# Patient Record
Sex: FEMALE | Race: WHITE
[De-identification: names, ages, dates, MRNs, and addresses within clinical notes are randomized per-mention and may not be internally consistent; named-entity substitution may affect disease eponyms.]

---

## 2017-02-08 ENCOUNTER — HOSPITAL ENCOUNTER (EMERGENCY)
Dept: HOSPITAL 76 - ED | Age: 37
LOS: 1 days | Discharge: HOME | End: 2017-02-09
Payer: COMMERCIAL

## 2017-02-08 DIAGNOSIS — S29.011A: Primary | ICD-10-CM
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
## 2017-02-08 DIAGNOSIS — M25.511: ICD-10-CM
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
## 2017-02-08 DIAGNOSIS — Z88.6: ICD-10-CM

## 2017-02-08 PROCEDURE — 71020: CPT

## 2017-02-08 PROCEDURE — 99283 EMERGENCY DEPT VISIT LOW MDM: CPT

## 2017-02-09 VITALS — DIASTOLIC BLOOD PRESSURE: 42 MMHG | SYSTOLIC BLOOD PRESSURE: 89 MMHG

## 2017-02-09 NOTE — XRAY PRELIMINARY REPORT
Accession: [id number]

Exam: XR Chest 2 View PA/LAT

 

IMPRESSION: 

1. No acute abnormality seen in the chest.

 

[facility]

 

SITE ID: [id number]

## 2017-02-09 NOTE — XRAY REPORT
EXAM:

CHEST RADIOGRAPHY

 

EXAM DATE: 2/9/2017 12:43 AM.

 

CLINICAL HISTORY: Chest and sternal pain on the right, 4th rib.

 

COMPARISON: None.

 

TECHNIQUE: 2 views.

 

FINDINGS: 

Lungs/Pleura: No focal opacities evident. No pneumothorax or pleural effusion. Normal volumes.

 

Mediastinum: Heart and mediastinal contours are unremarkable.

 

Other: None.

 

IMPRESSION: 

1. No acute abnormality seen in the chest.

 

[facility]

Referring Provider Line: [phone]

 

SITE ID: [id number]
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
## 2017-03-11 ENCOUNTER — HOSPITAL ENCOUNTER (EMERGENCY)
Age: 37
Discharge: HOME | End: 2017-03-11
Payer: COMMERCIAL

## 2017-03-11 DIAGNOSIS — N12: Primary | ICD-10-CM

## 2017-03-11 DIAGNOSIS — M51.35: ICD-10-CM

## 2017-03-11 DIAGNOSIS — Z97.5: ICD-10-CM

## 2017-03-11 PROCEDURE — 83690 ASSAY OF LIPASE: CPT

## 2017-03-11 PROCEDURE — 99283 EMERGENCY DEPT VISIT LOW MDM: CPT

## 2017-03-11 PROCEDURE — 99284 EMERGENCY DEPT VISIT MOD MDM: CPT

## 2017-03-11 PROCEDURE — 72100 X-RAY EXAM L-S SPINE 2/3 VWS: CPT

## 2017-03-11 PROCEDURE — 36415 COLL VENOUS BLD VENIPUNCTURE: CPT

## 2017-03-11 PROCEDURE — 85025 COMPLETE CBC W/AUTO DIFF WBC: CPT

## 2017-03-11 PROCEDURE — 81001 URINALYSIS AUTO W/SCOPE: CPT

## 2017-03-11 PROCEDURE — 80053 COMPREHEN METABOLIC PANEL: CPT

## 2017-03-11 PROCEDURE — 96372 THER/PROPH/DIAG INJ SC/IM: CPT

## 2017-03-11 PROCEDURE — 81025 URINE PREGNANCY TEST: CPT

## 2017-05-06 ENCOUNTER — HOSPITAL ENCOUNTER (EMERGENCY)
Dept: HOSPITAL 76 - ED | Age: 37
Discharge: HOME | End: 2017-05-06
Payer: COMMERCIAL

## 2017-05-06 VITALS — SYSTOLIC BLOOD PRESSURE: 119 MMHG | DIASTOLIC BLOOD PRESSURE: 75 MMHG

## 2017-05-06 DIAGNOSIS — G62.9: Primary | ICD-10-CM

## 2017-05-06 DIAGNOSIS — R07.89: ICD-10-CM

## 2017-05-06 LAB
ALBUMIN/GLOB SERPL: 1.4 {RATIO} (ref 1–2.2)
ANION GAP SERPL CALCULATED.4IONS-SCNC: 8 MMOL/L (ref 6–13)
BILIRUB BLD-MCNC: < 0.2 MG/DL (ref 0.2–1)
BUN SERPL-MCNC: 15 MG/DL (ref 6–20)
CALCIUM UR-MCNC: 9.1 MG/DL (ref 8.5–10.3)
CHLORIDE SERPL-SCNC: 100 MMOL/L (ref 101–111)
CO2 SERPL-SCNC: 27 MMOL/L (ref 21–32)
CREAT SERPLBLD-SCNC: 0.7 MG/DL (ref 0.4–1)
CUL URINE ADD CHARGE: (no result)
GFRSERPLBLD MDRD-ARVRAT: 95 ML/MIN/{1.73_M2} (ref 89–?)
GLOBULIN SER-MCNC: 3.4 G/DL (ref 2.1–4.2)
GLUCOSE SERPL-MCNC: 96 MG/DL (ref 70–100)
HCG UR QL: NEGATIVE
LIPASE SERPL-CCNC: 40 U/L (ref 22–51)
PH UR STRIP.AUTO: 5 PH (ref 5–7.5)
POTASSIUM SERPL-SCNC: 3.8 MMOL/L (ref 3.5–5)
PROT SPEC-MCNC: 8.1 G/DL (ref 6.7–8.2)
SODIUM SERPLBLD-SCNC: 135 MMOL/L (ref 135–145)
SP GR UR STRIP.AUTO: <=1.005 (ref 1–1.03)
UA CHARGE (STRIP ONLY): YES
UA W/ MICROSCOPIC CHARGE: (no result)
UR CULTURE IF IND: (no result)
UROBILINOGEN UR STRIP.AUTO-MCNC: NEGATIVE MG/DL

## 2017-05-06 PROCEDURE — 99283 EMERGENCY DEPT VISIT LOW MDM: CPT

## 2017-05-06 PROCEDURE — 36415 COLL VENOUS BLD VENIPUNCTURE: CPT

## 2017-05-06 PROCEDURE — 84443 ASSAY THYROID STIM HORMONE: CPT

## 2017-05-06 PROCEDURE — 93010 ELECTROCARDIOGRAM REPORT: CPT

## 2017-05-06 PROCEDURE — 93005 ELECTROCARDIOGRAM TRACING: CPT

## 2017-05-06 PROCEDURE — 80053 COMPREHEN METABOLIC PANEL: CPT

## 2017-05-06 PROCEDURE — 81025 URINE PREGNANCY TEST: CPT

## 2017-05-06 PROCEDURE — 99284 EMERGENCY DEPT VISIT MOD MDM: CPT

## 2017-05-06 PROCEDURE — 83690 ASSAY OF LIPASE: CPT

## 2017-05-06 PROCEDURE — 87086 URINE CULTURE/COLONY COUNT: CPT

## 2017-05-06 PROCEDURE — 81001 URINALYSIS AUTO W/SCOPE: CPT

## 2017-05-06 PROCEDURE — 81003 URINALYSIS AUTO W/O SCOPE: CPT

## 2017-05-06 NOTE — ED PHYSICIAN DOCUMENTATION
History of Present Illness





- Stated complaint


Stated Complaint: FEET PX





- Chief complaint


Chief Complaint: Ext Problem





- History obtained from


History obtained from: Patient





- History of Present Illness


Timing: Other (She is subacute occasional stabbing pains to the bottom of her 

feet, feels like she is getting stabbed, it only lasts for a few moments.  

Today it is associated with chest pressure and her hands are going numb as 

well.  There is no shortness of breath.  She is on a ketogenic diet.)





Review of Systems


Constitutional: denies: Fever, Chills


Nose: reports: Reviewed and negative


Cardiac: reports: Chest pain / pressure.  denies: Palpitations, Pedal edema, 

Calf pain


Respiratory: denies: Dyspnea





PD PAST MEDICAL HISTORY





- Past Medical History


: Kidney stones





- Past Surgical History


Past Surgical History: Yes





- Present Medications


Home Medications: 


 Ambulatory Orders











 Medication  Instructions  Recorded  Confirmed


 


Ciprofloxacin HCl [Cipro] 500 mg PO BID #20 tablet 03/11/17 


 


Promethazine [Phenergan] 25 mg PO Q6H PRN #10 tab 03/11/17 














- Allergies


Allergies/Adverse Reactions: 


 Allergies











Allergy/AdvReac Type Severity Reaction Status Date / Time


 


ibuprofen [From Motrin] Allergy  Respiratory Verified 02/09/17 00:06


 


naproxen sodium * Allergy  Respiratory Verified 02/09/17 00:06





[From Aleve Cold and Sinus]     














- Social History


Does the pt smoke?: No


Smoking Status: Never smoker


Does the pt drink ETOH?: No


Does the pt have substance abuse?: No





- Immunizations


Immunizations are current?: Yes





- POLST


Patient has POLST: No





PD ED PE NORMAL





- Vitals


Vital signs reviewed: Yes





- General


General: Alert and oriented X 3, No acute distress





- Neck


Neck: Supple, no meningeal sign, No bony TTP





- Cardiac


Cardiac: RRR, No murmur





- Respiratory


Respiratory: No respiratory distress, Clear bilaterally





- Abdomen


Abdomen: Soft, Non tender





- Extremities


Extremities: Other (Slightly diminished sensation to the bottom of both feet, 

difficulty with sharp versus dull discrimination but gross sensation is intact.

  Normal pedal and radial pulses.)





- Neuro


Neuro: Alert and oriented X 3, No motor deficit, No sensory deficit, Normal 

speech, Other





- Psych


Psych: Normal mood, Normal affect





Results





- Vitals


Vitals: 


 Vital Signs - 24 hr











  05/06/17





  19:09


 


Temperature 36.8 C


 


Heart Rate 74


 


Respiratory 12





Rate 


 


Blood Pressure 119/75


 


O2 Saturation 100








 Oxygen











O2 Source                      Room air

















- EKG (time done)


  ** 1935


Rate: Rate (enter#) (80)


Rhythm: NSR


Axis: Normal


Intervals: Normal OH


QRS: Normal


Ischemia: Normal ST segments


Computer interpretation: Agree with computer





- Labs


Labs: 


 Laboratory Tests











  05/06/17 05/06/17 05/06/17





  19:30 19:35 19:35


 


Sodium   135 


 


Potassium   3.8 


 


Chloride   100 L 


 


Carbon Dioxide   27 


 


Anion Gap   8.0 


 


BUN   15 


 


Creatinine   0.7 


 


Estimated GFR (MDRD)   95 


 


Glucose   96 


 


Calcium   9.1 


 


Total Bilirubin   < 0.2 L 


 


AST   22 


 


ALT   18 


 


Alkaline Phosphatase   54 


 


Total Protein   8.1 


 


Albumin   4.7 


 


Globulin   3.4 


 


Albumin/Globulin Ratio   1.4 


 


Lipase   40 


 


TSH    3.38


 


Urine Color  YELLOW  


 


Urine Clarity  CLEAR  


 


Urine pH  5.0  


 


Ur Specific Gravity  <=1.005  


 


Urine Protein  NEGATIVE  


 


Urine Glucose (UA)  NEGATIVE  


 


Urine Ketones  NEGATIVE  


 


Urine Occult Blood  NEGATIVE  


 


Urine Nitrite  NEGATIVE  


 


Urine Bilirubin  NEGATIVE  


 


Urine Urobilinogen  0.2 (NORMAL)  


 


Ur Leukocyte Esterase  NEGATIVE  


 


Ur Microscopic Review  NOT INDICATED  


 


Urine Culture Comments  NOT INDICATED  


 


Urine HCG, Qual  NEGATIVE  














PD MEDICAL DECISION MAKING





- ED course


ED course: 





She has a peripheral neuropathy of unclear etiology, doesn't seem to be a 

vascular cause.  Thyroid function electrolytes are normal.  She does drink 

heavily but not daily.  She is also on a ketogenic diet.


The patient and family were counseled as to the diagnosis and need for 

followup. I counseled the patient with regard to signs and symptoms that would 

necessitate an urgent reevaluation in the emergency department. They understand 

they are welcome to return at any time if worse or if not improving as 

expected. 





This document was made in part using voice recognition software. While efforts 

are made to proofread this document, sound alike and grammatical errors may 

occur.





Departure





- Departure


Disposition: 01 Home, Self Care


Clinical Impression: 


 Neuropathy


Condition: Good


Record reviewed to determine appropriate education?: Yes


Instructions:  ED Neuropathy Peripheral


Comments: 


TALK WITH YOUR PMD ABOUT NERVE CONDUCTION AND VITAMIN TESTING.

## 2017-09-04 ENCOUNTER — HOSPITAL ENCOUNTER (EMERGENCY)
Dept: HOSPITAL 76 - ED | Age: 37
Discharge: HOME | End: 2017-09-04
Payer: COMMERCIAL

## 2017-09-04 VITALS — SYSTOLIC BLOOD PRESSURE: 105 MMHG | DIASTOLIC BLOOD PRESSURE: 72 MMHG

## 2017-09-04 DIAGNOSIS — L03.113: Primary | ICD-10-CM

## 2017-09-04 DIAGNOSIS — T78.40XA: ICD-10-CM

## 2017-09-04 PROCEDURE — 99283 EMERGENCY DEPT VISIT LOW MDM: CPT
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
## 2018-03-12 ENCOUNTER — HOSPITAL ENCOUNTER (OUTPATIENT)
Dept: HOSPITAL 76 - EMS | Age: 38
Discharge: ANOTHER HOSPITAL | End: 2018-03-12
Attending: SURGERY
Payer: COMMERCIAL

## 2018-03-12 ENCOUNTER — HOSPITAL ENCOUNTER (EMERGENCY)
Dept: HOSPITAL 76 - ED | Age: 38
LOS: 1 days | Discharge: HOME | End: 2018-03-13
Payer: COMMERCIAL
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
## 2018-03-12 DIAGNOSIS — R41.0: ICD-10-CM

## 2018-03-12 DIAGNOSIS — S02.82XA: ICD-10-CM

## 2018-03-12 DIAGNOSIS — S09.90XA: Primary | ICD-10-CM

## 2018-03-12 DIAGNOSIS — R07.9: ICD-10-CM
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
## 2018-03-12 DIAGNOSIS — S01.81XA: ICD-10-CM

## 2018-03-12 DIAGNOSIS — R47.81: ICD-10-CM

## 2018-03-12 PROCEDURE — 71101 X-RAY EXAM UNILAT RIBS/CHEST: CPT

## 2018-03-12 PROCEDURE — 70450 CT HEAD/BRAIN W/O DYE: CPT

## 2018-03-12 PROCEDURE — 99285 EMERGENCY DEPT VISIT HI MDM: CPT

## 2018-03-12 PROCEDURE — 36415 COLL VENOUS BLD VENIPUNCTURE: CPT

## 2018-03-12 PROCEDURE — 80048 BASIC METABOLIC PNL TOTAL CA: CPT

## 2018-03-12 PROCEDURE — 99284 EMERGENCY DEPT VISIT MOD MDM: CPT

## 2018-03-12 PROCEDURE — 96374 THER/PROPH/DIAG INJ IV PUSH: CPT

## 2018-03-12 PROCEDURE — 80320 DRUG SCREEN QUANTALCOHOLS: CPT

## 2018-03-12 PROCEDURE — 70486 CT MAXILLOFACIAL W/O DYE: CPT

## 2018-03-12 PROCEDURE — 85025 COMPLETE CBC W/AUTO DIFF WBC: CPT

## 2018-03-12 PROCEDURE — 12011 RPR F/E/E/N/L/M 2.5 CM/<: CPT

## 2018-03-12 NOTE — CT REPORT
EXAM:

CT MAXILLOFACIAL WITHOUT CONTRAST

 

EXAM DATE: 3/12/2018 10:36 PM.

 

CLINICAL HISTORY: Head injury, left facial swelling.

 

COMPARISONS: None.

 

TECHNIQUE: Thin-section axial images were acquired of the face without contrast. Post-processing: Cor
onal and sagittal reformats. Other: None.

 

In accordance with CT protocol optimization, one or more of the following dose reduction techniques w
ere utilized for this exam: automated exposure control, adjustment of mA and/or KV based on patient s
ize, or use of iterative reconstructive technique.

 

FINDINGS:

Soft Tissue: The infratemporal fossa and parapharyngeal spaces are unremarkable.

 

Orbits: Symmetric and unremarkable.

 

Bones: Nondisplaced left orbital roof fracture (series 6, image 47 and series 3, image 122).

 

Temporomandibular Joints: The temporomandibular joints are symmetric and normally located.

 

Sinuses: Mild left maxillary sinus and mild ethmoid sinus mucosal thickening. No sinus fluid levels.

 

Other: None.

 

IMPRESSION: Nondisplaced left orbital roof fracture.

 

[facility]

Referring Provider Line: [phone]

 

SITE ID: [id number]

## 2018-03-12 NOTE — XRAY REPORT
EXAM:

LEFT RIB RADIOGRAPHY

 

EXAM DATE: 3/12/2018 10:49 PM.

 

CLINICAL HISTORY: Fall, left chest pain.

 

COMPARISON: Chest, 02/09/2017.

 

TECHNIQUE: 1 view of the chest and 2 views of the ribs.

 

FINDINGS: 

Bones: No fracture seen.

 

Lungs: No alveolar consolidation or pleural effusion. No pneumothorax.

 

Mediastinum: Heart and mediastinal contours are unremarkable.

 

Other: None.

 

IMPRESSION: 

1. No acute abnormality seen in the chest or ribs.

 

[facility]

Referring Provider Line: [phone]

 

SITE ID: [id number]

## 2018-03-12 NOTE — CT PRELIMINARY REPORT
Accession: [id number]

Exam: CT HEAD W/O

 

IMPRESSION: No acute intracranial process.

 

[facility]

 

SITE ID: [id number]
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
## 2018-03-13 VITALS — DIASTOLIC BLOOD PRESSURE: 71 MMHG | SYSTOLIC BLOOD PRESSURE: 91 MMHG

## 2018-03-13 LAB
ANION GAP SERPL CALCULATED.4IONS-SCNC: 10 MMOL/L (ref 6–13)
BASOPHILS NFR BLD AUTO: 0 10^3/UL (ref 0–0.1)
BASOPHILS NFR BLD AUTO: 0.5 %
BUN SERPL-MCNC: 11 MG/DL (ref 6–20)
CALCIUM UR-MCNC: 8.6 MG/DL (ref 8.5–10.3)
CHLORIDE SERPL-SCNC: 107 MMOL/L (ref 101–111)
CO2 SERPL-SCNC: 24 MMOL/L (ref 21–32)
CREAT SERPLBLD-SCNC: 0.5 MG/DL (ref 0.4–1)
EOSINOPHIL # BLD AUTO: 0.1 10^3/UL (ref 0–0.7)
EOSINOPHIL NFR BLD AUTO: 1.1 %
ERYTHROCYTE [DISTWIDTH] IN BLOOD BY AUTOMATED COUNT: 14.5 % (ref 12–15)
GFRSERPLBLD MDRD-ARVRAT: 139 ML/MIN/{1.73_M2} (ref 89–?)
GLUCOSE SERPL-MCNC: 100 MG/DL (ref 70–100)
HGB UR QL STRIP: 13.9 G/DL (ref 12–16)
LYMPHOCYTES # SPEC AUTO: 2 10^3/UL (ref 1.5–3.5)
LYMPHOCYTES NFR BLD AUTO: 30.2 %
MCH RBC QN AUTO: 31.1 PG (ref 27–31)
MCHC RBC AUTO-ENTMCNC: 32.6 G/DL (ref 32–36)
MCV RBC AUTO: 95.3 FL (ref 81–99)
MONOCYTES # BLD AUTO: 0.2 10^3/UL (ref 0–1)
MONOCYTES NFR BLD AUTO: 3.2 %
NEUTROPHILS # BLD AUTO: 4.3 10^3/UL (ref 1.5–6.6)
NEUTROPHILS # SNV AUTO: 6.5 X10^3/UL (ref 4.8–10.8)
NEUTROPHILS NFR BLD AUTO: 65 %
PDW BLD AUTO: 8.1 FL (ref 7.9–10.8)
PLATELET # BLD: 346 10^3/UL (ref 130–450)
RBC MAR: 4.48 10^6/UL (ref 4.2–5.4)
SODIUM SERPLBLD-SCNC: 141 MMOL/L (ref 135–145)

## 2018-09-20 ENCOUNTER — HOSPITAL ENCOUNTER (EMERGENCY)
Dept: HOSPITAL 76 - ED | Age: 38
Discharge: HOME | End: 2018-09-20
Payer: COMMERCIAL

## 2018-09-20 VITALS — DIASTOLIC BLOOD PRESSURE: 73 MMHG | SYSTOLIC BLOOD PRESSURE: 114 MMHG

## 2018-09-20 DIAGNOSIS — K21.0: Primary | ICD-10-CM

## 2018-09-20 PROCEDURE — 99283 EMERGENCY DEPT VISIT LOW MDM: CPT

## 2018-09-20 PROCEDURE — 93005 ELECTROCARDIOGRAM TRACING: CPT

## 2018-09-20 NOTE — ED PHYSICIAN DOCUMENTATION
PD HPI CHEST PAIN





- Stated complaint


Stated Complaint: CHEST PX





- Chief complaint


Chief Complaint: Cardiac





- History obtained from


History obtained from: Patient





- History of Present Illness


Timing - onset: Today


Timing - onset during: Rest


Timing - duration: Hours


Timing - details: Gradual onset, Still present


Quality: Sharp, Pain


Location: Substernal, Left chest


Radiation: Abdominal, Left upper extremity


Improved by: Antacids


Worsened by: Palpation


Associated symptoms: No: Shortness of air, Diaphoresis, Nausea, Vomiting, 

Feeling faint / dizzy, General Weakness, Palpitations, Cough


Similar symptoms before: Diagnosis (heart burn)


Recently seen: Not recently seen





Review of Systems


Constitutional: denies: Fever, Chills


Eyes: denies: Decreased vision


Ears: denies: Ear pain


Nose: denies: Congestion


Throat: denies: Sore throat


Cardiac: reports: Chest pain / pressure.  denies: Palpitations


Respiratory: denies: Dyspnea, Cough


GI: reports: Abdominal Pain.  denies: Abdominal Swelling, Nausea, Vomiting, 

Diarrhea


: denies: Dysuria, Frequency


Skin: denies: Rash





PD PAST MEDICAL HISTORY





- Past Medical History


Past Medical History: Yes


: Kidney stones





- Past Surgical History


Past Surgical History: Yes





- Present Medications


Home Medications: 


                                Ambulatory Orders











 Medication  Instructions  Recorded  Confirmed


 


Sucralfate [Carafate] 1 gm PO ACHS #30 tablet 09/20/18 














- Allergies


Allergies/Adverse Reactions: 


                                    Allergies











Allergy/AdvReac Type Severity Reaction Status Date / Time


 


ibuprofen [From Motrin] Allergy  Respiratory Verified 09/20/18 10:40


 


naproxen sodium * Allergy  Respiratory Verified 09/20/18 10:40





[From Aleve Cold and Sinus]     














- Social History


Does the pt smoke?: No


Smoking Status: Never smoker


Does the pt drink ETOH?: No


Does the pt have substance abuse?: No





- Immunizations


Immunizations are current?: Yes





- POLST


Patient has POLST: No





PD ED PE NORMAL





- Vitals


Vital signs reviewed: Yes (normal )





- General


General: Alert and oriented X 3, Well developed/nourished, Other (patient 

appears to be in pain )





- HEENT


HEENT: Atraumatic, PERRL, EOMI





- Neck


Neck: Supple, no meningeal sign, No bony TTP





- Cardiac


Cardiac: RRR, No murmur





- Respiratory


Respiratory: No respiratory distress, Clear bilaterally





- Abdomen


Abdomen: Soft, Other (mild epigastric tenderness )





- Back


Back: No CVA TTP, No spinal TTP





- Derm


Derm: Normal color, Warm and dry, No rash





- Extremities


Extremities: No deformity, No edema





- Neuro


Neuro: Alert and oriented X 3, CNs 2-12 intact, No motor deficit, No sensory 

deficit, Normal speech


Eye Opening: Spontaneous


Motor: Obeys Commands


Verbal: Oriented


GCS Score: 15





- Psych


Psych: Normal mood, Normal affect





Results





- Vitals


Vitals: 


                               Vital Signs - 24 hr











  09/20/18





  10:35


 


Temperature 36.8 C


 


Heart Rate 78


 


Respiratory 16





Rate 


 


Blood Pressure 114/73


 


O2 Saturation 100








                                     Oxygen











O2 Source                      Venturi mask

















- EKG (time done)


  ** 1038


Rate: Rate (enter#) (79)


Rhythm: NSR


Ischemia: Normal ST segments


Compare to prior EKG: Unchanged from prior EKG (5-6-17)


Computer interpretation: Agree with computer





PD MEDICAL DECISION MAKING





- ED course


Complexity details: reviewed results, re-evaluated patient, considered 

differential, d/w patient


ED course: 





37-year-old female with heartburn that radiates up into her neck denies use of 

ibuprofen or Aleve and states that she does drink on a regular basis beer..  She

has developed this increased symptoms this morning and got very little relief w

ith Rolaids.  Here in the emergency department she is administered a GI cocktail

consisting of 10 mg of viscous lidocaine and 30 mg Mylanta with near complete 

resolution of her pain.  I suspect her pain is related to reflux.  She is given 

a dose of Pepcid here in the emergency department we will start her on some





- Sepsis Event


Vital Signs: 


                               Vital Signs - 24 hr











  09/20/18





  10:35


 


Temperature 36.8 C


 


Heart Rate 78


 


Respiratory 16





Rate 


 


Blood Pressure 114/73


 


O2 Saturation 100








                                     Oxygen











O2 Source                      Venturi mask

















Departure





- Departure


Disposition: 01 Home, Self Care


Clinical Impression: 


 Reflux esophagitis





Condition: Stable


Instructions:  ED GERD


Follow-Up: 


Yovanny Rodríguez ARNP [Primary Care Provider] - 


Prescriptions: 


Sucralfate [Carafate] 1 gm PO ACHS #30 tablet


Comments: 


Today it appears the symptoms of chest pain you are having are related to your 

esophagus and stomach.  It is imperative that you use a medication to reduce the

 acid in her stomach for at least 10 days.  I recommend you use Pepcid AC.


Discharge Date/Time: 09/20/18 12:35

## 2018-12-10 ENCOUNTER — HOSPITAL ENCOUNTER (OUTPATIENT)
Dept: HOSPITAL 76 - SDS | Age: 38
Discharge: HOME | End: 2018-12-10
Attending: OBSTETRICS & GYNECOLOGY
Payer: COMMERCIAL

## 2018-12-10 VITALS — SYSTOLIC BLOOD PRESSURE: 112 MMHG | DIASTOLIC BLOOD PRESSURE: 60 MMHG

## 2018-12-10 DIAGNOSIS — F17.200: ICD-10-CM

## 2018-12-10 DIAGNOSIS — T83.32XA: Primary | ICD-10-CM

## 2018-12-10 LAB
HCG UR QL: NEGATIVE
SP GR UR STRIP.AUTO: >=1.03 (ref 1–1.03)

## 2018-12-10 PROCEDURE — 58301 REMOVE INTRAUTERINE DEVICE: CPT

## 2018-12-10 PROCEDURE — 58555 HYSTEROSCOPY DX SEP PROC: CPT

## 2018-12-10 PROCEDURE — 87430 STREP A AG IA: CPT

## 2018-12-10 PROCEDURE — 87070 CULTURE OTHR SPECIMN AEROBIC: CPT

## 2018-12-10 PROCEDURE — 58300 INSERT INTRAUTERINE DEVICE: CPT

## 2018-12-10 PROCEDURE — 0U2DXHZ CHANGE CONTRACEPTIVE DEVICE IN UTERUS AND CERVIX, EXTERNAL APPROACH: ICD-10-PCS | Performed by: OBSTETRICS & GYNECOLOGY

## 2018-12-10 PROCEDURE — 0UJD8ZZ INSPECTION OF UTERUS AND CERVIX, VIA NATURAL OR ARTIFICIAL OPENING ENDOSCOPIC: ICD-10-PCS | Performed by: OBSTETRICS & GYNECOLOGY

## 2018-12-10 PROCEDURE — 81025 URINE PREGNANCY TEST: CPT

## 2018-12-10 NOTE — OPERATIVE REPORT
Operative Report





- General


Planned Procedure: Hysteroscopic removal of intrauterine device (IUD), insertion

of IUD


Pre-Op Diagnosis: Mechanical complication of IUD


Procedure Performed: 





Dilation of the cervix, removal of retained intrauterine device, insertion of 

new progestin (Mirena) intrauterine device


Post Op Diagnosis: WEN





- Procedure Note


Primary Surgeon: Dr. Mariel Arriaza


Secondary Surgeon: None


Anesthesia Provider: MANAN Payne


Anesthesia Technique: General LMA, Local


Pathology: 





None


IV Fluids (mL): 700


Estimated Blood Loss (mL): 5


Urine Output (mL): 15


Complications: 





None





- Other


Other Information/Narrative: 





Indications:  The patient is a 38-year-old  2 para 1 abortus 1 female 

here for hysteroscopic removal of a retained Mirena intrauterine device followed

by reinsertion of a new Mirena intrauterine device.  She had a failed attempt at

removal in the GYN clinic x2, including once with misoprostol to soften the 

cervix.  She also had failed attempts with her PCM.  At the time of the last 

clinic attempt, the cervix could be readily entered, but the IUD could not be 

grasped and removed with packing forceps, IUD hook, or Cytobrush.  She had a 

pelvic ultrasound which showed appropriately placed intrauterine device despite 

the strings not being seen visible.The risks, benefits, limitations, 

alternatives, and expectations of surgery were discussed.  The consent was 

reviewed and signed prior to surgery.





Findings: Uterus anteverted and approximately 6 weeks in size.  No adnexal 

masses were palpable.  Internal os was slightly stenotic but opened easily with 

serial dilation using Hanks dilators.  IUD removed with blind sweeps using 

alligator clamp.  Uterus sounded to 9.5 cm.  





Procedure: 


The patient was taken to the operating room, where general anesthesia with LMA 

was administered without difficulty.  She was then positioned in the low dorsal 

lithotomy position with her lower extremities in Yellow Fin stirrups.  Exam 

under anesthesia was then performed with the findings as noted above.  Vagina 

and perineum were then prepped and draped in a sterile fashion, and bladder was 

drained with an in-and-out catheterization.  Procedure Time-Out was then 

performed.





A sterile bivalved speculum was then placed into the vagina, and the anterior 

lip of the cervix was grasped with a single-tooth tenaculum.  One percent L

idocaine with epinephrine was then injected at the 2:00, 4:00, 7:00, and 10:00 

positions for a paracervical block.  Serial dilation using Tone dilators was 

then performed.  An IUD hook was inserted and unsuccessful at retrieving the 

strings.  Blind placement of alligator forcep was then performed, and on the 

fourth attempt, the IUD was grasped and removed.  The uterus was then sounded, 

and the new Mirena IUD inserted without difficulty.  Mirena Lot # [id number], 

expiration date 3/2021.  Strings were trimmed to 3 cm. There was minimal 

bleeding from the cervix at this time.  The tenaculum was removed, and the 

tenaculum sites were hemostatic without need for silver nitrate.





At this point, the procedure was deemed completed.  Sponge, lap, and needle 

count were correct x 3, and there were no complications.  The patient was 

awakened, replaced supine, and transferred to the PACU in stable condition.

## 2018-12-10 NOTE — ANESTHESIA
Pre-Anesthesia VS, & Labs





- Diagnosis





Mechanical complication of IUD





- Procedure





Hysteroscopy, IUD removal


Vital Signs: 





                                        











Temp Pulse Resp BP Pulse Ox


 


 36.6 C   99   18   112/63   99 


 


 12/10/18 11:09  12/10/18 11:09  12/10/18 11:09  12/10/18 11:09  12/10/18 11:09














                                        





Height                           5 ft 7 in


Weight (kg)                      73.48 kg


Body Mass Index                  25.3











- NPO


>8 hours


Last Fluid Intake: Water at 0900





- Pregnancy


Is Patient Pregnant?: No





- Lab Results


Lab results reviewed: Yes





Home Medications and Allergies


Home Medications: 


Ambulatory Orders





Omeprazole Magnesium [Prilosec] 10 mg PO 18 











                                        





Omeprazole Magnesium [Prilosec] 10 mg PO 18 








Allergies/Adverse Reactions: 


                                    Allergies











Allergy/AdvReac Type Severity Reaction Status Date / Time


 


ibuprofen [From Motrin] Allergy  Respiratory Verified 18 10:38


 


naproxen sodium * Allergy  Respiratory Verified 18 10:38





[From Aleve Cold and Sinus]     


 


NSAIDS (Non-Steroidal Allergy  Respiratory Verified 12/10/18 11:38





Anti-Inflamma     


 


ciprofloxacin [From Cipro] AdvReac  Rash Verified 18 10:38














Anes History & Medical History





- Anesthetic History


Anesthesia Complications: reports: No previous complications


Family history of Anesthesia Complications: Denies


Family history of Malignant Hyperthermia: Denies





- Medical History


Cardiovascular: reports: None


Pulmonary: reports: None


Gastrointestinal: reports: None, GERD


Urinary: reports: Kidney stones


Neuro: reports: None


Musculoskeletal: reports: None


Endocrine/Autoimmune: reports: None


Blood Disorders: reports: None


Skin: reports: None


Smoking Status: Current some day smoker


Psychosocial: reports: No issues indicated





- Surgical History


Urologic: Ureterolithotomy (stones)


Gynecologic:  section





Exam


General: Alert, Oriented x3


Dental: WNL, TMJ


Mouth Opening: Greater than 4 Fingerbreadths


Mallampati classification: I


Thyromental Distance: greater than 6 cm


Respiratory: Lungs clear


Cardiovascular: Regular rate


Neurological: Normal speech


Mental/Cognitive Status: Alert/Oriented X3


Cognitive Status: Within normal limits





Plan


Anesthesia Type: General


Consent for Procedure(s) Verified and Reviewed: Yes


Code Status: Attempt Resuscitation


ASA classification: 2-Mild systemic disease


Is this case an emergency?: No

## 2020-02-02 ENCOUNTER — HOSPITAL ENCOUNTER (EMERGENCY)
Dept: HOSPITAL 76 - ED | Age: 40
Discharge: HOME | End: 2020-02-02
Payer: COMMERCIAL

## 2020-02-02 VITALS — DIASTOLIC BLOOD PRESSURE: 61 MMHG | SYSTOLIC BLOOD PRESSURE: 95 MMHG

## 2020-02-02 DIAGNOSIS — L03.012: Primary | ICD-10-CM

## 2020-02-02 DIAGNOSIS — F17.200: ICD-10-CM

## 2020-02-02 LAB
ANION GAP SERPL CALCULATED.4IONS-SCNC: 12 MMOL/L (ref 6–13)
BASOPHILS NFR BLD AUTO: 0 10^3/UL (ref 0–0.1)
BASOPHILS NFR BLD AUTO: 0.4 %
BUN SERPL-MCNC: 24 MG/DL (ref 6–20)
CALCIUM UR-MCNC: 8.5 MG/DL (ref 8.5–10.3)
CHLORIDE SERPL-SCNC: 100 MMOL/L (ref 101–111)
CO2 SERPL-SCNC: 23 MMOL/L (ref 21–32)
CREAT SERPLBLD-SCNC: 0.7 MG/DL (ref 0.4–1)
EOSINOPHIL # BLD AUTO: 0.3 10^3/UL (ref 0–0.7)
EOSINOPHIL NFR BLD AUTO: 4.9 %
ERYTHROCYTE [DISTWIDTH] IN BLOOD BY AUTOMATED COUNT: 12.9 % (ref 12–15)
GFRSERPLBLD MDRD-ARVRAT: 93 ML/MIN/{1.73_M2} (ref 89–?)
GLUCOSE SERPL-MCNC: 99 MG/DL (ref 70–100)
HGB UR QL STRIP: 12.8 G/DL (ref 12–16)
LYMPHOCYTES # SPEC AUTO: 1.7 10^3/UL (ref 1.5–3.5)
LYMPHOCYTES NFR BLD AUTO: 25.2 %
MCH RBC QN AUTO: 33 PG (ref 27–31)
MCHC RBC AUTO-ENTMCNC: 33.3 G/DL (ref 32–36)
MCV RBC AUTO: 99 FL (ref 81–99)
MONOCYTES # BLD AUTO: 0.5 10^3/UL (ref 0–1)
MONOCYTES NFR BLD AUTO: 7.9 %
NEUTROPHILS # BLD AUTO: 4.1 10^3/UL (ref 1.5–6.6)
NEUTROPHILS # SNV AUTO: 6.7 X10^3/UL (ref 4.8–10.8)
NEUTROPHILS NFR BLD AUTO: 61.3 %
PDW BLD AUTO: 9.6 FL (ref 7.9–10.8)
PLATELET # BLD: 257 10^3/UL (ref 130–450)
RBC MAR: 3.88 10^6/UL (ref 4.2–5.4)
SODIUM SERPLBLD-SCNC: 135 MMOL/L (ref 135–145)

## 2020-02-02 PROCEDURE — 87040 BLOOD CULTURE FOR BACTERIA: CPT

## 2020-02-02 PROCEDURE — 90714 TD VACC NO PRESV 7 YRS+ IM: CPT

## 2020-02-02 PROCEDURE — 99284 EMERGENCY DEPT VISIT MOD MDM: CPT

## 2020-02-02 PROCEDURE — 96365 THER/PROPH/DIAG IV INF INIT: CPT

## 2020-02-02 PROCEDURE — 85025 COMPLETE CBC W/AUTO DIFF WBC: CPT

## 2020-02-02 PROCEDURE — 73130 X-RAY EXAM OF HAND: CPT

## 2020-02-02 PROCEDURE — 36415 COLL VENOUS BLD VENIPUNCTURE: CPT

## 2020-02-02 PROCEDURE — 96375 TX/PRO/DX INJ NEW DRUG ADDON: CPT

## 2020-02-02 PROCEDURE — 90471 IMMUNIZATION ADMIN: CPT

## 2020-02-02 PROCEDURE — 80048 BASIC METABOLIC PNL TOTAL CA: CPT

## 2020-02-02 NOTE — XRAY REPORT
Reason:  SWELLING

Procedure Date:  02/02/2020   

Accession Number:  [id number] / [id number]                    

Procedure:  XR  - Hand 3 View LT CPT Code:  

 

***Final Report***

 

 

FULL RESULT:

 

 

EXAM:

LEFT HAND RADIOGRAPHY

 

EXAM DATE: 2/2/2020 03:56 AM.

 

CLINICAL HISTORY: SWELLING.

 

COMPARISON: None.

 

TECHNIQUE: 3 views.

 

FINDINGS:

Bones: No acute fracture seen. No focal area of bone destruction.

 

Joints: No dislocation seen. Joint spaces appear intact.

 

Soft Tissues: Soft tissue swelling. No radiopaque foreign body identified.

IMPRESSION:

1. No acute osseous abnormality seen.

2. No radiopaque foreign body.

 

[facility]

## 2020-02-02 NOTE — ED PHYSICIAN DOCUMENTATION
History of Present Illness





- Stated complaint


Stated Complaint: L HAND SWELLING





- History obtained from


History obtained from: Patient (Patient is a right-hand-dominant 39-year-old 

female who presents with a chief complaint of swelling and redness to the left 

hand.  The patient reports that she [place] bowling [place] and she felt she got 

bit by something on her left index finger she reports she has had increased 

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

with mussels. She denies any fevers or trauma she denies any history of MRSA or 

any history of IV drug abuse. the patient reports she is not currenlty pregnant 

and reports her LMP was 1 week ago.)





Review of Systems


Constitutional: reports: Reviewed and negative


Eyes: reports: Reviewed and negative


Ears: reports: Reviewed and negative


Nose: reports: Reviewed and negative


Throat: reports: Reviewed and negative


Cardiac: reports: Reviewed and negative


Respiratory: reports: Reviewed and negative


GI: reports: Reviewed and negative


: reports: Reviewed and negative


Skin: reports: Other (Redness and swelling to the left hand)


Musculoskeletal: reports: Other (Redness and swelling to the left hand.)


Neurologic: reports: Reviewed and negative


Psychiatric: reports: Reviewed and negative


Endocrine: reports: Reviewed and negative


Immunocompromised: reports: Reviewed and negative





PD PAST MEDICAL HISTORY





- Past Medical History


Cardiovascular: None


Respiratory: None


Neuro: None


Endocrine/Autoimmune: None


GI: None, GERD


: Kidney stones


HEENT: None


Psych: Depression, Anxiety, Panic attacks


Musculoskeletal: None


Derm: None





- Past Surgical History


Past Surgical History: Yes


/GYN:  section





- Present Medications


Home Medications: 


                                Ambulatory Orders











 Medication  Instructions  Recorded  Confirmed


 


Doxycycline Monohydrate 100 mg PO BID 10 Days #20 tablet 20 














- Allergies


Allergies/Adverse Reactions: 


                                    Allergies











Allergy/AdvReac Type Severity Reaction Status Date / Time


 


ibuprofen [From Motrin] Allergy  Respiratory Verified 20 03:48


 


naproxen sodium * Allergy  Respiratory Verified 20 03:48





[From Aleve Cold and Sinus]     


 


NSAIDS (Non-Steroidal Allergy  Respiratory Verified 20 03:48





Anti-Inflamma     


 


ciprofloxacin [From Cipro] AdvReac  Rash Verified 20 03:48














- Social History


Does the pt smoke?: No


Smoking Status: Current some day smoker


Does the pt drink ETOH?: No


Does the pt have substance abuse?: No





- Immunizations


Immunizations are current?: Yes





- POLST


Patient has POLST: No





PD ED PE NORMAL





- Vitals


Vital signs reviewed: Yes





- General


General: Alert and oriented X 3, No acute distress





- HEENT


HEENT: PERRL





- Neck


Neck: Supple, no meningeal sign





- Cardiac


Cardiac: RRR, No murmur





- Respiratory


Respiratory: Clear bilaterally





- Abdomen


Abdomen: Normal bowel sounds, Soft, Non tender, Non distended





- Derm


Derm: Other (There is swelling and redness diffusely over the palmar aspect of 

the index finger of the left hand at the MCP joint and a minimal amount at the 

PIP joint.  There is swelling but it is asymmetric of the left first index 

finger the patient is able to extend at the MCP as well as the PIP and DIP on 

passive and active range of motion there is diffuse tenderness there is mild 

pain on passive flexion but there is not severe pain on passive extension at the

MCP PIP and DIP joints.)





- Extremities


Extremities: Other (There is swelling and redness diffusely over the palmar 

aspect of the index finger of the left hand at the MCP joint and a minimal 

amount at the PIP joint.  There is swelling but it is asymmetric of the left 

first index finger the patient is able to extend at the MCP as well as the PIP 

and DIP on passive and active range of motion there is diffuse tenderness there 

is mild pain on passive flexion but there is not severe pain on passive 

extension at the MCP PIP and DIP joints.There is no crepitus her sensations 

intact to light touch there is no streaking there is no epitrochlear or axillary

lymphadenopathy.)





- Neuro


Neuro: Alert and oriented X 3





- Psych


Psych: Normal mood, Normal affect





Results





- Vitals


Vitals: 


                               Vital Signs - 24 hr











  20





  03:40 05:05


 


Temperature 36.9 C 


 


Heart Rate 70 55 L


 


Respiratory 16 16





Rate  


 


Blood Pressure 119/72 100/70


 


O2 Saturation 100 100








                                     Oxygen











O2 Source                      Room air

















- Labs


Labs: 


                                Laboratory Tests











  20





  04:20 04:20


 


WBC  6.7 


 


RBC  3.88 L 


 


Hgb  12.8 


 


Hct  38.4 


 


MCV  99.0 


 


MCH  33.0 H 


 


MCHC  33.3 


 


RDW  12.9 


 


Plt Count  257 


 


MPV  9.6 


 


Neut # (Auto)  4.1 


 


Lymph # (Auto)  1.7 


 


Mono # (Auto)  0.5 


 


Eos # (Auto)  0.3 


 


Baso # (Auto)  0.0 


 


Absolute Nucleated RBC  0.00 


 


Nucleated RBC %  0.0 


 


Sodium   135


 


Potassium   3.7


 


Chloride   100 L


 


Carbon Dioxide   23


 


Anion Gap   12.0


 


BUN   24 H


 


Creatinine   0.7


 


Estimated GFR (MDRD)   93


 


Glucose   99


 


Calcium   8.5














PD MEDICAL DECISION MAKING





- ED course


Complexity details: other (There is no Local orthopedic surgery on-call.  There 

is no Local hand surgeon on call. I had a lengthy discussion with this patient 

regarding her physical exam and my concern for infectious tenosynovitis I did 

recommend that the patient be admitted for IV antibiotics to fill facility that 

would have a hand surgeon available, the patient is refusing to be transferred 

to higher level of care she does have medical decision-making capability and 

capacity and accepts all the risks of not being transferred to higher level of 

care to include possible disfigurement of her left hand to include worsening 

infection and possible life-threatening conditions as well as limb threatening 

conditions.  Again had a lengthy discussion with this patient and she would like

to be discharged home and placed on oral antibiotics and follow-up as an 

outpatient.)





Departure





- Departure


Disposition: 01 Home, Self Care


Clinical Impression: 


Cellulitis


Qualifiers:


 Site of cellulitis: extremity Site of cellulitis of extremity: finger Later

ality: left Qualified Code(s): L03.012 - Cellulitis of left finger





Condition: Good


Instructions:  ED Infec Skin Cellulitis


Follow-Up: 


Yovanny Rodríguez ARNP [Primary Care Provider] - Tomorrow


Prescriptions: 


Doxycycline Monohydrate 100 mg PO BID 10 Days #20 tablet


Comments: 


Take antibiotics as directed. Follow up with your primary care provider tomorrow

for a recheck, if worsening return to an emergency department for further 

evaluation.

## 2021-05-24 ENCOUNTER — HOSPITAL ENCOUNTER (OUTPATIENT)
Age: 41
End: 2021-05-24
Payer: COMMERCIAL

## 2021-05-24 DIAGNOSIS — F41.8: Primary | ICD-10-CM

## 2021-05-24 LAB
ADD MANUAL DIFF / SLIDE REVIEW: NO
ALBUMIN SERPL-MCNC: 4.3 G/DL (ref 3.5–5)
ALBUMIN/GLOB SERPL: 1.5 {RATIO} (ref 1–2.8)
ALP SERPL-CCNC: 47 U/L (ref 38–126)
ALT SERPL-CCNC: 24 IU/L (ref ?–35)
BUN SERPL-MCNC: 12 MG/DL (ref 7–17)
CALCIUM SERPL-MCNC: 9 MG/DL (ref 8.4–10.2)
CHLORIDE SERPL-SCNC: 106 MMOL/L (ref 98–107)
CO2 SERPL-SCNC: 24 MMOL/L (ref 22–32)
ESTIMATED GLOMERULAR FILT RATE: > 60 ML/MIN (ref 60–?)
GLOBULIN SER CALC-MCNC: 2.8 G/DL (ref 1.7–4.1)
GLUCOSE SERPL-MCNC: 98 MG/DL (ref 70–100)
HEMATOCRIT: 39 % (ref 36–46)
HEMOGLOBIN: 13.2 G/DL (ref 12–16)
HEMOLYSIS: < 15 (ref 0–50)
LYMPHOCYTES # SPEC AUTO: 1200 /UL (ref 1100–4500)
MCV RBC: 98.6 FL (ref 80–100)
MEAN CORPUSCULAR HEMOGLOBIN: 33.2 PG (ref 26–34)
MEAN CORPUSCULAR HGB CONC: 33.7 % (ref 30–36)
PLATELET COUNT: 273 X10^3/UL (ref 150–400)
POTASSIUM SERPL-SCNC: 4.3 MMOL/L (ref 3.4–5.1)
PROT SERPL-MCNC: 7.1 G/DL (ref 6.3–8.2)
SODIUM SERPL-SCNC: 137 MMOL/L (ref 137–145)
T4 FREE SERPL-MCNC: 1.04 NG/DL (ref 0.78–2.19)
TSH SERPL DL<=0.05 MIU/L-ACNC: 1.64 UIU/ML (ref 0.47–4.68)

## 2021-05-24 PROCEDURE — 80053 COMPREHEN METABOLIC PANEL: CPT

## 2021-05-24 PROCEDURE — 84443 ASSAY THYROID STIM HORMONE: CPT

## 2021-05-24 PROCEDURE — 85025 COMPLETE CBC W/AUTO DIFF WBC: CPT

## 2021-05-24 PROCEDURE — 36415 COLL VENOUS BLD VENIPUNCTURE: CPT

## 2021-05-24 PROCEDURE — 84439 ASSAY OF FREE THYROXINE: CPT

## 2021-06-21 ENCOUNTER — HOSPITAL ENCOUNTER (OUTPATIENT)
Age: 41
End: 2021-06-21
Payer: COMMERCIAL

## 2021-06-21 DIAGNOSIS — M47.816: ICD-10-CM

## 2021-06-21 DIAGNOSIS — Z87.828: ICD-10-CM

## 2021-06-21 DIAGNOSIS — M54.5: ICD-10-CM

## 2021-06-21 DIAGNOSIS — G89.29: ICD-10-CM

## 2021-06-21 DIAGNOSIS — R51.9: Primary | ICD-10-CM

## 2021-06-21 DIAGNOSIS — M79.605: ICD-10-CM

## 2021-06-21 PROCEDURE — 70551 MRI BRAIN STEM W/O DYE: CPT

## 2021-06-21 PROCEDURE — 72110 X-RAY EXAM L-2 SPINE 4/>VWS: CPT

## 2021-09-13 ENCOUNTER — HOSPITAL ENCOUNTER (OUTPATIENT)
Dept: HOSPITAL 76 - DI | Age: 41
Discharge: HOME | End: 2021-09-13
Attending: FAMILY MEDICINE
Payer: COMMERCIAL

## 2021-09-13 DIAGNOSIS — R92.8: ICD-10-CM

## 2021-09-13 DIAGNOSIS — N63.20: ICD-10-CM

## 2021-09-13 DIAGNOSIS — N64.4: Primary | ICD-10-CM

## 2021-09-14 NOTE — MAMMOGRAPHY REPORT
BILATERAL DIGITAL DIAGNOSTIC MAMMOGRAM 3D/2D: 9/13/2021

CLINICAL: Palpable left breast lump. Diffuse left breast pain. Baseline exam.  

 

No prior exams were available for comparison.  The tissue of both breasts is heterogeneously dense. T
his may lower the sensitivity of mammography.  

 

 

No significant masses, calcifications, or other findings are seen in either breast.  

 

No abnormality which corresponds with the palpable abnormality is seen.

IMPRESSION: INCOMPLETE: NEEDS ADDITIONAL IMAGING EVALUATION

There is no abnormality seen in the left breast to correspond with the area of clinical concern and p
alpable abnormality, however, ultrasound is recommended.  

 

 

 

 

This exam was interpreted at Station ID: [id number].  

 

NOTE: For mammograms, a report in lay terms will be sent to the patient. Approximately 15% of breast 
malignancies will not be visualized mammographically. In the management of a palpable breast mass, a 
negative mammogram must not discourage biopsy of a clinically suspicious lesion.

 

Electronically Signed By: Ke Mario          

acr/:9/13/2021 10:14:25  

 

 

 

ACR BI-RADS Category 0: Incomplete 3340F

PARENCHYMAL PATTERN: (D) - The breast(s) demonstrate(s) heterogeneously dense fibroglandular phillip berman.

BI-RADS CATEGORY: (0) - 0

Ultrasound

[id number]

Immediate follow-up

LATERALITY: (L)

## 2021-09-14 NOTE — ULTRASOUND REPORT
LIMITED ULTRASOUND OF LEFT BREAST: 9/13/2021

CLINICAL: Palpable left breast lumps x 2.  

 

Comparison is made to exam dated:  9/13/2021 mammogram - [facility].  

 Real-time ultrasound of the left breast 2 o'clock and 6 o'clock regions was performed.  Gray scale i
mages of the real-time examination were reviewed.  

 

No abnormality which corresponds with the palpable abnormality is seen.

 

IMPRESSION: NEGATIVE 

There is no sonographic evidence of malignancy.  

 

There is no abnormality seen in the left breast to correspond with the area of clinical concern and p
alpable abnormality, however, clinical followup is recommended.  

 

Return to annual mammogram screening schedule is recommended.  

 

 

This exam was interpreted at Station ID: [id number].  

Electronically Signed By: Ke Mario  

acr/:9/13/2021 12:24:07  

 

 

 

Ultrasound BI-RADS: 1 Negative

BI-RADS CATEGORY: (1) - 1

RECOMMENDATION: (ANNUAL)  - Recommend routine annual screening mammography.

[id number]

return to screening

LATERALITY: (B) normal

## 2021-10-16 ENCOUNTER — HOSPITAL ENCOUNTER (EMERGENCY)
Dept: HOSPITAL 76 - ED | Age: 41
Discharge: HOME | End: 2021-10-16
Payer: COMMERCIAL

## 2021-10-16 ENCOUNTER — HOSPITAL ENCOUNTER (OUTPATIENT)
Dept: HOSPITAL 76 - LAB | Age: 41
Discharge: HOME | End: 2021-10-16
Attending: FAMILY MEDICINE
Payer: COMMERCIAL

## 2021-10-16 VITALS — DIASTOLIC BLOOD PRESSURE: 73 MMHG | SYSTOLIC BLOOD PRESSURE: 121 MMHG

## 2021-10-16 DIAGNOSIS — R10.13: ICD-10-CM

## 2021-10-16 DIAGNOSIS — R10.13: Primary | ICD-10-CM

## 2021-10-16 DIAGNOSIS — N30.90: Primary | ICD-10-CM

## 2021-10-16 DIAGNOSIS — K29.70: ICD-10-CM

## 2021-10-16 LAB
ALBUMIN DIAFP-MCNC: 4.7 G/DL (ref 3.2–5.5)
ALBUMIN/GLOB SERPL: 1.7 {RATIO} (ref 1–2.2)
ALP SERPL-CCNC: 48 IU/L (ref 42–121)
ALT SERPL W P-5'-P-CCNC: 17 IU/L (ref 10–60)
ANION GAP SERPL CALCULATED.4IONS-SCNC: 11 MMOL/L (ref 6–13)
AST SERPL W P-5'-P-CCNC: 19 IU/L (ref 10–42)
BASOPHILS NFR BLD AUTO: 0 10^3/UL (ref 0–0.1)
BASOPHILS NFR BLD AUTO: 0.4 %
BILIRUB BLD-MCNC: 1.1 MG/DL (ref 0.2–1)
BUN SERPL-MCNC: 13 MG/DL (ref 6–20)
CALCIUM UR-MCNC: 9.1 MG/DL (ref 8.5–10.3)
CHLORIDE SERPL-SCNC: 99 MMOL/L (ref 101–111)
CLARITY UR REFRACT.AUTO: CLEAR
CO2 SERPL-SCNC: 23 MMOL/L (ref 21–32)
CREAT SERPLBLD-SCNC: 0.7 MG/DL (ref 0.4–1)
EOSINOPHIL # BLD AUTO: 0.1 10^3/UL (ref 0–0.7)
EOSINOPHIL NFR BLD AUTO: 1.5 %
ERYTHROCYTE [DISTWIDTH] IN BLOOD BY AUTOMATED COUNT: 12.3 % (ref 12–15)
GFRSERPLBLD MDRD-ARVRAT: 92 ML/MIN/{1.73_M2} (ref 89–?)
GLOBULIN SER-MCNC: 2.7 G/DL (ref 2.1–4.2)
GLUCOSE SERPL-MCNC: 98 MG/DL (ref 70–100)
GLUCOSE UR QL STRIP.AUTO: NEGATIVE MG/DL
HCG UR QL: NEGATIVE
HCT VFR BLD AUTO: 39 % (ref 37–47)
HGB UR QL STRIP: 13.1 G/DL (ref 12–16)
KETONES UR QL STRIP.AUTO: (no result) MG/DL
LYMPHOCYTES # SPEC AUTO: 1.9 10^3/UL (ref 1.5–3.5)
LYMPHOCYTES NFR BLD AUTO: 25.6 %
MCH RBC QN AUTO: 32.3 PG (ref 27–31)
MCHC RBC AUTO-ENTMCNC: 33.6 G/DL (ref 32–36)
MCV RBC AUTO: 96.1 FL (ref 81–99)
MONOCYTES # BLD AUTO: 0.6 10^3/UL (ref 0–1)
MONOCYTES NFR BLD AUTO: 7.4 %
NEUTROPHILS # BLD AUTO: 4.8 10^3/UL (ref 1.5–6.6)
NEUTROPHILS # SNV AUTO: 7.4 X10^3/UL (ref 4.8–10.8)
NEUTROPHILS NFR BLD AUTO: 64.8 %
NITRITE UR QL STRIP.AUTO: NEGATIVE
NRBC # BLD AUTO: 0 /100WBC
NRBC # BLD AUTO: 0 X10^3/UL
PDW BLD AUTO: 9.4 FL (ref 7.9–10.8)
PH UR STRIP.AUTO: 5.5 PH (ref 5–7.5)
PLATELET # BLD: 284 10^3/UL (ref 130–450)
POTASSIUM SERPL-SCNC: 3.8 MMOL/L (ref 3.5–5)
PROT SPEC-MCNC: 7.4 G/DL (ref 6.7–8.2)
PROT UR STRIP.AUTO-MCNC: NEGATIVE MG/DL
RBC # UR STRIP.AUTO: (no result) /UL
RBC MAR: 4.06 10^6/UL (ref 4.2–5.4)
SODIUM SERPLBLD-SCNC: 133 MMOL/L (ref 135–145)
SP GR UR STRIP.AUTO: >=1.03 (ref 1–1.03)
UROBILINOGEN UR QL STRIP.AUTO: (no result) E.U./DL
UROBILINOGEN UR STRIP.AUTO-MCNC: NEGATIVE MG/DL

## 2021-10-16 PROCEDURE — 81003 URINALYSIS AUTO W/O SCOPE: CPT

## 2021-10-16 PROCEDURE — 87086 URINE CULTURE/COLONY COUNT: CPT

## 2021-10-16 PROCEDURE — 36415 COLL VENOUS BLD VENIPUNCTURE: CPT

## 2021-10-16 PROCEDURE — 81025 URINE PREGNANCY TEST: CPT

## 2021-10-16 PROCEDURE — 85025 COMPLETE CBC W/AUTO DIFF WBC: CPT

## 2021-10-16 PROCEDURE — 99283 EMERGENCY DEPT VISIT LOW MDM: CPT

## 2021-10-16 PROCEDURE — 80053 COMPREHEN METABOLIC PANEL: CPT

## 2021-10-16 PROCEDURE — 81001 URINALYSIS AUTO W/SCOPE: CPT

## 2021-10-16 NOTE — ED PHYSICIAN DOCUMENTATION
PD HPI FEMALE 





- Stated complaint


Stated Complaint: FEMALE 





- Chief complaint


Chief Complaint: UTI





- History obtained from


History obtained from: Patient





- Additional information


Additional information: 





2 days urinary frequency with suprapubic pressure and dysuria. No flank pain or 

fevers.





Review of Systems


Ten Systems: 10 systems reviewed and negative


Constitutional: denies: Fever, Chills


GI: reports: Abdominal Pain (epigastric "burning" postprandial, chronic).  

denies: Diarrhea, Hematemesis, Bloody / black stool


: reports: Dysuria, Frequency





PD PAST MEDICAL HISTORY





- Past Medical History


Past Medical History: Yes


Cardiovascular: None


Respiratory: None


Neuro: None


Endocrine/Autoimmune: None


GI: GERD


GYN: None


: Kidney stones


HEENT: None


Psych: Depression, Anxiety, Panic attacks


Musculoskeletal: None


Derm: None





- Past Surgical History


Past Surgical History: Yes


/GYN:  section





- Present Medications


Home Medications: 


                                Ambulatory Orders











 Medication  Instructions  Recorded  Confirmed


 


Doxycycline Monohydrate 100 mg PO BID 10 Days #20 tablet 20 


 


Nitrofurantoin [Macrobid] 1 cap PO BID #10 cap 10/16/21 














- Allergies


Allergies/Adverse Reactions: 


                                    Allergies











Allergy/AdvReac Type Severity Reaction Status Date / Time


 


ibuprofen [From Motrin] Allergy  Respiratory Verified 10/16/21 16:58


 


naproxen sodium * Allergy  Respiratory Verified 10/16/21 16:58





[From Aleve Cold and Sinus]     


 


NSAIDS (Non-Steroidal Allergy  Respiratory Verified 10/16/21 16:58





Anti-Inflamma     


 


ciprofloxacin [From Cipro] AdvReac  Rash Verified 10/16/21 16:58














- Social History


Does the pt smoke?: No


Smoking Status: Never smoker


Does the pt drink ETOH?: No


Does the pt have substance abuse?: No





- Immunizations


Immunizations are current?: Yes





- POLST


Patient has POLST: No





PD ED PE NORMAL





- Vitals


Vital signs reviewed: Yes





- General


General: Alert and oriented X 3, No acute distress





- Cardiac


Cardiac: RRR, No murmur





- Respiratory


Respiratory: No respiratory distress, Clear bilaterally





- Abdomen


Abdomen: Normal bowel sounds, Soft, Non tender





- Back


Back: No CVA TTP, No spinal TTP





- Derm


Derm: Normal color, Warm and dry





- Extremities


Extremities: No edema, No calf tenderness / cord





- Neuro


Neuro: Alert and oriented X 3, Normal speech





Results





- Vitals


Vitals: 


                               Vital Signs - 24 hr











  10/16/21





  16:55


 


Temperature 36.9 C


 


Heart Rate 83


 


Respiratory 16





Rate 


 


Blood Pressure 121/73


 


O2 Saturation 96








                                     Oxygen











O2 Source                      Room air

















- Labs


Labs: 


                                Laboratory Tests











  10/16/21





  17:03


 


Urine Color  YELLOW


 


Urine Clarity  CLEAR


 


Urine pH  5.5


 


Ur Specific Gravity  >=1.030 H


 


Urine Protein  NEGATIVE


 


Urine Glucose (UA)  NEGATIVE


 


Urine Ketones  TRACE


 


Urine Occult Blood  TRACE-INTA


 


Urine Nitrite  NEGATIVE


 


Urine Bilirubin  NEGATIVE


 


Urine Urobilinogen  0.2 (NORMAL)


 


Ur Leukocyte Esterase  NEGATIVE


 


Ur Microscopic Review  NOT INDICATED


 


Urine Culture Comments  NOT INDICATED


 


Urine HCG, Qual  NEGATIVE














PD MEDICAL DECISION MAKING





- ED course


ED course: 





Current symptoms point to cystitis despite the negative UA.  She also has 

ongoing gastritis and is getting a referral for gastroenterology or surgery for 

an EGD which is appropriate.





Departure





- Departure


Disposition: 01 Home, Self Care


Clinical Impression: 


 Cystitis





Condition: Good


Record reviewed to determine appropriate education?: Yes


Instructions:  ED UTI Cystitis Female


Follow-Up: 


[facility] [Provider Group]


Prescriptions: 


Nitrofurantoin [Macrobid] 1 cap PO BID #10 cap


Comments: 


If you do not hear from the general surgery office soon, the phone number is on 

this form and you can call.  Return for new or worsening symptoms.

## 2021-11-19 ENCOUNTER — HOSPITAL ENCOUNTER (EMERGENCY)
Dept: HOSPITAL 76 - ED | Age: 41
Discharge: HOME | End: 2021-11-19
Payer: COMMERCIAL

## 2021-11-19 VITALS — SYSTOLIC BLOOD PRESSURE: 112 MMHG | DIASTOLIC BLOOD PRESSURE: 75 MMHG

## 2021-11-19 DIAGNOSIS — M79.661: Primary | ICD-10-CM

## 2021-11-19 PROCEDURE — 99282 EMERGENCY DEPT VISIT SF MDM: CPT

## 2021-11-19 PROCEDURE — 99284 EMERGENCY DEPT VISIT MOD MDM: CPT

## 2021-11-19 NOTE — ULTRASOUND REPORT
PROCEDURE:  Duplex Ext Veins Right

 

INDICATIONS:  RLE pain

 

TECHNIQUE:  

Real-time imaging, as well as color and pulse Doppler interrogation, were performed of the lower extr
emity deep veins from the inguinal ligament to the popliteal fossa.  

 

COMPARISON:  None.

 

FINDINGS:  The deep veins are normally compressible, and free of intraluminal thrombus.  Color and pu
lse Doppler demonstrate normal phasic intraluminal flow.  There is normal augmentation response to di
stal compression maneuver.  

 

 

IMPRESSION:  

 

No evidence of deep vein thrombosis involving the right lower extremity.

 

Reviewed by: Estrella Christianson MD, PhD on 11/19/2021 4:41 PM PST

Approved by: Estrella Christianson MD, PhD on 11/19/2021 4:41 PM PST

 

 

Station ID:  [id number]

## 2021-12-02 ENCOUNTER — HOSPITAL ENCOUNTER (OUTPATIENT)
Age: 41
End: 2021-12-02
Payer: COMMERCIAL

## 2021-12-02 DIAGNOSIS — R10.13: Primary | ICD-10-CM

## 2021-12-02 PROCEDURE — 76700 US EXAM ABDOM COMPLETE: CPT

## 2022-03-10 ENCOUNTER — HOSPITAL ENCOUNTER (OUTPATIENT)
Age: 42
End: 2022-03-10
Payer: COMMERCIAL

## 2022-03-10 DIAGNOSIS — R35.0: Primary | ICD-10-CM

## 2022-03-10 LAB
APPEARANCE UR: CLEAR
BILIRUBIN URINE UA: NEGATIVE
COLOR UR: YELLOW
GLUCOSE URINE UA: NEGATIVE G/DL
HGB UR QL: (no result)
KETONES URINE UA: NEGATIVE
LEUKOCYTE ESTERASE URINE UA: NEGATIVE
NITRITE URINE UA: NEGATIVE
PH UR: 6 [PH] (ref 4.5–8)
PROTEIN URINE UA: NEGATIVE
SP GR UR: 1.02 (ref 1–1.03)
UROBILINOGEN UR QL: 0.2 E.U./DL

## 2022-03-10 PROCEDURE — 81001 URINALYSIS AUTO W/SCOPE: CPT

## 2022-03-31 NOTE — ED PHYSICIAN DOCUMENTATION
-- DO NOT REPLY / DO NOT REPLY ALL --  -- Message is from the [facility]--    General Patient Message      Reason for Call: Patient returning call from Jone please call back.    Caller Information       Type Contact Phone    03/31/2022 11:15 AM CDT Phone (Incoming) DaianaSg nuñez Landry (Self) [phone] (M)          Alternative phone number: [phone]      Turnaround time given to caller:   \"This message will be sent to [state Provider's name]. The clinical team will fulfill your request as soon as they review your message.\"     History of Present Illness





- Stated complaint


Stated Complaint: RIGHT LEG PX





- Chief complaint


Chief Complaint: Ext Problem





- History obtained from


History obtained from: Patient





- History of Present Illness


Timing: How many weeks ago (3)


Pain level max: 3


Pain level now: 2





- Additonal information


Additional information: 





Patient is a 41-year-old female who presents to the emergency department 

complaining of bubbling in her right leg for the past 3 weeks.  She states that 

it feels like air bubbles traveling through a hose.  She states she gets the 

sensation from her foot to her groin.  Nothing makes it worse.  Better with 

compression stockings.  She contacted her doctor today who referred her here to 

rule out DVT.  Patient is not having any shortness of breath or chest pain.  No 

history of blood clots.  She states she has having nasal congestion as well. No 

leg swelling or color changes.





Review of Systems


Constitutional: denies: Fever, Chills


Respiratory: denies: Cough


GI: denies: Nausea, Vomiting, Diarrhea


Skin: denies: Rash


Musculoskeletal: denies: Neck pain, Back pain


Neurologic: denies: Headache





PD PAST MEDICAL HISTORY





- Past Medical History


Cardiovascular: None


Respiratory: None


Neuro: None


Endocrine/Autoimmune: None


GI: GERD


GYN: None


: Kidney stones


HEENT: None


Psych: Depression, Anxiety, Panic attacks


Musculoskeletal: None


Derm: None





- Past Surgical History


Past Surgical History: Yes


/GYN:  section





- Present Medications


Home Medications: 


                                Ambulatory Orders











 Medication  Instructions  Recorded  Confirmed


 


No Known Home Medications  21














- Allergies


Allergies/Adverse Reactions: 


                                    Allergies











Allergy/AdvReac Type Severity Reaction Status Date / Time


 


ibuprofen [From Motrin] Allergy  Respiratory Verified 10/16/21 16:58


 


naproxen sodium * Allergy  Respiratory Verified 10/16/21 16:58





[From Aleve Cold and Sinus]     


 


NSAIDS (Non-Steroidal Allergy  Respiratory Verified 10/16/21 16:58





Anti-Inflamma     


 


varenicline [From Chantix] Allergy  Hives Verified 21 14:42


 


ciprofloxacin [From Cipro] AdvReac  Rash Verified 10/16/21 16:58














- Social History


Does the pt smoke?: No


Smoking Status: Never smoker


Does the pt drink ETOH?: No


Does the pt have substance abuse?: No





- Immunizations


Immunizations are current?: Yes





- POLST


Patient has POLST: No





PD ED PE NORMAL





- Vitals


Vital signs reviewed: Yes





- General


General: Alert and oriented X 3, No acute distress





- HEENT


HEENT: Moist mucous membranes





- Neck


Neck: Supple, no meningeal sign





- Cardiac


Cardiac: RRR





- Respiratory


Respiratory: No respiratory distress, Clear bilaterally





- Abdomen


Abdomen: Soft, Non tender, Non distended





- Derm


Derm: Warm and dry





- Extremities


Extremities: No edema





- Neuro


Neuro: Alert and oriented X 3





Results





- Vitals


Vitals: 


                               Vital Signs - 24 hr











  21





  14:33


 


Temperature 36.0 C L


 


Heart Rate 105 H


 


Respiratory 16





Rate 


 


Blood Pressure 112/75


 


O2 Saturation 98








                                     Oxygen











O2 Source                      Room air

















- Rads (name of study)


  ** Right lower extremity ultrasound


Radiology: Final report received, EMP read contemporaneously, See rad report (no

DVT)





PD MEDICAL DECISION MAKING





- ED course


Complexity details: reviewed results, re-evaluated patient, considered 

differential, d/w patient


ED course: 





41-year-old female with leg pain of unclear etiology.  No acute findings on 

ultrasound.  No significant findings physical exam.  We will have her follow-up 

with her doctor for further care.  Patient counseled regarding signs and 

symptoms for which I believe and urgent re-evaluation would be necessary. 

Patient with good understanding of and agreement to plan and is comfortable 

going home at this time





This document was made in part using voice recognition software. While efforts 

are made to proofread this document, sound alike and grammatical errors may 

occur.





Patient is ambulating without any difficulty in the emergency department.  

Normal gait





Departure





- Departure


Disposition: 01 Home, Self Care


Clinical Impression: 


Leg pain


Qualifiers:


 Laterality: right Qualified Code(s): M79.604 - Pain in right leg





Condition: Good


Instructions:  ED Acute Pain [facility]


Follow-Up: 


Yovanny Rodríguez ARNP [Primary Care Provider] - Within 1 week


Comments: 


The cause of your pain is unclear.  Your ultrasound is normal today.  Is 

recommend to follow-up with your doctor for further care.  I would continue the 

compression stockings at home.  Return if you worsen.


Discharge Date/Time: 21 16:53

## 2022-05-25 ENCOUNTER — HOSPITAL ENCOUNTER (OUTPATIENT)
Age: 42
End: 2022-05-25
Payer: COMMERCIAL

## 2022-05-25 DIAGNOSIS — N28.89: Primary | ICD-10-CM

## 2022-05-25 PROCEDURE — 76770 US EXAM ABDO BACK WALL COMP: CPT

## 2022-08-08 ENCOUNTER — HOSPITAL ENCOUNTER (OUTPATIENT)
Age: 42
End: 2022-08-08
Payer: COMMERCIAL

## 2022-08-08 DIAGNOSIS — N28.89: Primary | ICD-10-CM

## 2022-08-08 LAB
ADD MANUAL DIFF / SLIDE REVIEW: NO
APPEARANCE UR: CLEAR
BILIRUBIN URINE UA: NEGATIVE
BUN SERPL-MCNC: 13 MG/DL (ref 7–17)
CALCIUM SERPL-MCNC: 9.1 MG/DL (ref 8.4–10.2)
CHLORIDE SERPL-SCNC: 104 MMOL/L (ref 98–107)
CO2 SERPL-SCNC: 26 MMOL/L (ref 22–32)
COLOR UR: YELLOW
ESTIMATED GLOMERULAR FILT RATE: > 60 ML/MIN (ref 60–?)
GLUCOSE SERPL-MCNC: 90 MG/DL (ref 70–100)
GLUCOSE URINE UA: NEGATIVE G/DL
HEMATOCRIT: 36.1 % (ref 36–46)
HEMOGLOBIN: 12.4 G/DL (ref 12–16)
HEMOLYSIS: < 15 (ref 0–50)
HGB UR QL: (no result)
KETONES URINE UA: NEGATIVE
LEUKOCYTE ESTERASE URINE UA: (no result)
LYMPHOCYTES # SPEC AUTO: 1900 /UL (ref 1100–4500)
MCV RBC: 92.7 FL (ref 80–100)
MEAN CORPUSCULAR HEMOGLOBIN: 31.8 PG (ref 26–34)
MEAN CORPUSCULAR HGB CONC: 34.3 % (ref 30–36)
NITRITE URINE UA: NEGATIVE
PH UR: 5.5 [PH] (ref 4.5–8)
PLATELET COUNT: 288 X10^3/UL (ref 150–400)
POTASSIUM SERPL-SCNC: 4.5 MMOL/L (ref 3.4–5.1)
PROTEIN URINE UA: NEGATIVE
SODIUM SERPL-SCNC: 135 MMOL/L (ref 137–145)
SP GR UR: 1.02 (ref 1–1.03)
UROBILINOGEN UR QL: 0.2 E.U./DL

## 2022-08-08 PROCEDURE — 80048 BASIC METABOLIC PNL TOTAL CA: CPT

## 2022-08-08 PROCEDURE — 81001 URINALYSIS AUTO W/SCOPE: CPT

## 2022-08-08 PROCEDURE — 36415 COLL VENOUS BLD VENIPUNCTURE: CPT

## 2022-08-08 PROCEDURE — 85025 COMPLETE CBC W/AUTO DIFF WBC: CPT

## 2022-09-07 ENCOUNTER — HOSPITAL ENCOUNTER (OUTPATIENT)
Age: 42
End: 2022-09-07
Payer: COMMERCIAL

## 2022-09-07 DIAGNOSIS — N63.20: Primary | ICD-10-CM

## 2022-09-07 PROCEDURE — 77066 DX MAMMO INCL CAD BI: CPT

## 2022-09-07 PROCEDURE — 76642 ULTRASOUND BREAST LIMITED: CPT

## 2022-12-19 ENCOUNTER — HOSPITAL ENCOUNTER (OUTPATIENT)
Age: 42
End: 2022-12-19
Payer: COMMERCIAL

## 2022-12-19 DIAGNOSIS — K57.90: ICD-10-CM

## 2022-12-19 DIAGNOSIS — K44.9: ICD-10-CM

## 2022-12-19 DIAGNOSIS — R39.9: ICD-10-CM

## 2022-12-19 DIAGNOSIS — F17.201: ICD-10-CM

## 2022-12-19 DIAGNOSIS — Z97.5: ICD-10-CM

## 2022-12-19 DIAGNOSIS — N13.30: Primary | ICD-10-CM

## 2022-12-19 DIAGNOSIS — Z80.52: ICD-10-CM

## 2022-12-19 DIAGNOSIS — R10.9: ICD-10-CM

## 2022-12-19 PROCEDURE — 74178 CT ABD&PLV WO CNTR FLWD CNTR: CPT

## 2023-04-12 ENCOUNTER — HOSPITAL ENCOUNTER (OUTPATIENT)
Age: 43
End: 2023-04-12
Payer: COMMERCIAL

## 2023-04-12 DIAGNOSIS — Z80.52: ICD-10-CM

## 2023-04-12 DIAGNOSIS — N28.89: ICD-10-CM

## 2023-04-12 DIAGNOSIS — Z87.898: ICD-10-CM

## 2023-04-12 DIAGNOSIS — R39.9: ICD-10-CM

## 2023-04-12 DIAGNOSIS — N13.30: Primary | ICD-10-CM

## 2023-04-12 DIAGNOSIS — F17.201: ICD-10-CM

## 2023-04-12 PROCEDURE — 76770 US EXAM ABDO BACK WALL COMP: CPT

## 2023-10-05 ENCOUNTER — HOSPITAL ENCOUNTER (EMERGENCY)
Dept: HOSPITAL 76 - ED | Age: 43
Discharge: HOME | End: 2023-10-05
Payer: COMMERCIAL

## 2023-10-05 VITALS — SYSTOLIC BLOOD PRESSURE: 121 MMHG | DIASTOLIC BLOOD PRESSURE: 84 MMHG | OXYGEN SATURATION: 100 %

## 2023-10-05 DIAGNOSIS — K29.50: ICD-10-CM

## 2023-10-05 DIAGNOSIS — S39.012A: Primary | ICD-10-CM
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
## 2023-10-05 LAB
ALBUMIN DIAFP-MCNC: 4.2 G/DL (ref 3.2–5.5)
ALBUMIN/GLOB SERPL: 1.6 {RATIO} (ref 1–2.2)
ALP SERPL-CCNC: 55 IU/L (ref 42–121)
ALT SERPL W P-5'-P-CCNC: 74 IU/L (ref 10–60)
ANION GAP SERPL CALCULATED.4IONS-SCNC: 5 MMOL/L (ref 6–13)
AST SERPL W P-5'-P-CCNC: 38 IU/L (ref 10–42)
BASOPHILS NFR BLD AUTO: 0 10^3/UL (ref 0–0.1)
BASOPHILS NFR BLD AUTO: 0.4 %
BILIRUB BLD-MCNC: 0.6 MG/DL (ref 0.2–1)
BUN SERPL-MCNC: 13 MG/DL (ref 6–20)
CALCIUM UR-MCNC: 8.9 MG/DL (ref 8.5–10.3)
CHLORIDE SERPL-SCNC: 104 MMOL/L (ref 101–111)
CLARITY UR REFRACT.AUTO: CLEAR
CO2 SERPL-SCNC: 28 MMOL/L (ref 21–32)
CREAT SERPLBLD-SCNC: 0.7 MG/DL (ref 0.6–1.3)
EOSINOPHIL # BLD AUTO: 0.1 10^3/UL (ref 0–0.7)
EOSINOPHIL NFR BLD AUTO: 1.8 %
ERYTHROCYTE [DISTWIDTH] IN BLOOD BY AUTOMATED COUNT: 13 % (ref 12–15)
GFRSERPLBLD MDRD-ARVRAT: 91 ML/MIN/{1.73_M2} (ref 89–?)
GLOBULIN SER-MCNC: 2.6 G/DL (ref 2.1–4.2)
GLUCOSE SERPL-MCNC: 102 MG/DL (ref 74–104)
GLUCOSE UR QL STRIP.AUTO: NEGATIVE MG/DL
HCG UR QL: NEGATIVE
HCT VFR BLD AUTO: 36.6 % (ref 37–47)
HGB UR QL STRIP: 12 G/DL (ref 12–16)
KETONES UR QL STRIP.AUTO: NEGATIVE MG/DL
LIPASE SERPL-CCNC: 63 U/L (ref 11–82)
LYMPHOCYTES # SPEC AUTO: 2 10^3/UL (ref 1.5–3.5)
LYMPHOCYTES NFR BLD AUTO: 43.8 %
MCH RBC QN AUTO: 29.9 PG (ref 27–31)
MCHC RBC AUTO-ENTMCNC: 32.8 G/DL (ref 32–36)
MCV RBC AUTO: 91.3 FL (ref 81–99)
MONOCYTES # BLD AUTO: 0.4 10^3/UL (ref 0–1)
MONOCYTES NFR BLD AUTO: 9.6 %
NEUTROPHILS # BLD AUTO: 2 10^3/UL (ref 1.5–6.6)
NEUTROPHILS # SNV AUTO: 4.5 X10^3/UL (ref 4.8–10.8)
NEUTROPHILS NFR BLD AUTO: 44 %
NITRITE UR QL STRIP.AUTO: NEGATIVE
NRBC # BLD AUTO: 0 /100WBC
NRBC # BLD AUTO: 0 X10^3/UL
PDW BLD AUTO: 9.1 FL (ref 7.9–10.8)
PH UR STRIP.AUTO: 6 PH (ref 5–7.5)
PLATELET # BLD: 232 10^3/UL (ref 130–450)
POTASSIUM SERPL-SCNC: 3.9 MMOL/L (ref 3.5–4.5)
PROT SPEC-MCNC: 6.8 G/DL (ref 6.4–8.9)
PROT UR STRIP.AUTO-MCNC: NEGATIVE MG/DL
RBC # UR STRIP.AUTO: NEGATIVE /UL
RBC MAR: 4.01 10^6/UL (ref 4.2–5.4)
SODIUM SERPLBLD-SCNC: 137 MMOL/L (ref 135–145)
SP GR UR STRIP.AUTO: 1.01 (ref 1–1.03)
UROBILINOGEN UR QL STRIP.AUTO: (no result) E.U./DL
UROBILINOGEN UR STRIP.AUTO-MCNC: NEGATIVE MG/DL

## 2023-10-05 PROCEDURE — 87086 URINE CULTURE/COLONY COUNT: CPT

## 2023-10-05 PROCEDURE — 83690 ASSAY OF LIPASE: CPT

## 2023-10-05 PROCEDURE — 36415 COLL VENOUS BLD VENIPUNCTURE: CPT

## 2023-10-05 PROCEDURE — 96374 THER/PROPH/DIAG INJ IV PUSH: CPT

## 2023-10-05 PROCEDURE — 81025 URINE PREGNANCY TEST: CPT

## 2023-10-05 PROCEDURE — 81003 URINALYSIS AUTO W/O SCOPE: CPT

## 2023-10-05 PROCEDURE — 80053 COMPREHEN METABOLIC PANEL: CPT

## 2023-10-05 PROCEDURE — 85025 COMPLETE CBC W/AUTO DIFF WBC: CPT

## 2023-10-05 PROCEDURE — 81001 URINALYSIS AUTO W/SCOPE: CPT

## 2023-10-05 PROCEDURE — 99283 EMERGENCY DEPT VISIT LOW MDM: CPT

## 2023-10-05 NOTE — ED PHYSICIAN DOCUMENTATION
History of Present Illness





- Stated complaint


Stated Complaint: BACK/ABD PX





- Chief complaint


Chief Complaint: Back Pain





- History obtained from


History obtained from: Patient





- History of Present Illness


Timing: How many weeks ago (several weeks)





- Additonal information


Additional information: 





Patient is a 43-year-old female who presents to the emergency department with 2 

complaints.  The first is epigastric abdominal pain.  Ongoing for the past 

several years.  She states it comes and goes.  She has been told that she has 

hiatal hernia and GERD.  She has not taken any medications, she states she is 

treating it with various teas.  She is also complaining of low back pain.  

Ongoing for the past several weeks.  Worse with movement, better with rest.  No 

loss of bowel or bladder control.  No numbness or tingling.  No head injury.  

She states that she does heavy lifting at work and her back has been sore.  She 

states she has gone to the chiropractor several times but no relief.  She states

she does not like to take medications.  No fevers.  No chills.  Denies any 

possibility of pregnancy.





Review of Systems


Constitutional: denies: Fever, Chills


Throat: denies: Sore throat


Cardiac: denies: Chest pain / pressure


Respiratory: denies: Dyspnea, Cough


GI: denies: Vomiting, Diarrhea, Hematemesis, Bloody / black stool


: denies: Dysuria, Frequency, Hesitancy, Unable to Void, Incontinent


Skin: denies: Rash


Musculoskeletal: denies: Neck pain


Neurologic: denies: Focal weakness, Numbness, Headache





PD PAST MEDICAL HISTORY





- Past Medical History


Cardiovascular: None


Respiratory: None


Neuro: None


Endocrine/Autoimmune: None


GI: GERD


GYN: None


: Kidney stones


HEENT: None


Psych: Depression, Anxiety, Panic attacks


Musculoskeletal: None


Derm: None





- Past Surgical History


Past Surgical History: Yes


/GYN:  section





- Present Medications


Home Medications: 


                                Ambulatory Orders











 Medication  Instructions  Recorded  Confirmed


 


Esomeprazole Magnesium [Nexium] 40 mg PO DAILY #30 cap 10/05/23 


 


Famotidine [Pepcid] 20 mg PO BID #60 tablet 10/05/23 


 


Sucralfate [Carafate] 1 gm PO ACHS #60 tablet 10/05/23 


 


methocarbamoL [Robaxin] 500 mg PO Q6H PRN #20 tablet 10/05/23 


 


oxyCODONE [Roxicodone] 5 - 10 mg PO Q6H PRN #20 tablet 10/05/23 





 MDD 6  














- Allergies


Allergies/Adverse Reactions: 


                                    Allergies











Allergy/AdvReac Type Severity Reaction Status Date / Time


 


ibuprofen [From Motrin] Allergy  Respiratory Verified 10/05/23 17:16


 


naproxen sodium * Allergy  Respiratory Verified 10/05/23 17:16





[From Aleve Cold and Sinus]     


 


NSAIDS (Non-Steroidal Allergy  Respiratory Verified 10/05/23 17:16





Anti-Inflamma     


 


varenicline [From Chantix] Allergy  Hives Verified 10/05/23 17:16


 


ciprofloxacin [From Cipro] AdvReac  Rash Verified 10/05/23 17:16














- Social History


Does the pt smoke?: No


Smoking Status: Never smoker


Does the pt drink ETOH?: No


Does the pt have substance abuse?: No





- Immunizations


Immunizations are current?: Yes





- POLST


Patient has POLST: No





PD ED PE NORMAL





- Vitals


Vital signs reviewed: Yes





- General


General: Alert and oriented X 3, No acute distress





- HEENT


HEENT: PERRL, Moist mucous membranes





- Neck


Neck: Supple, no meningeal sign





- Cardiac


Cardiac: RRR, Strong equal pulses





- Respiratory


Respiratory: No respiratory distress, Clear bilaterally





- Abdomen


Abdomen: Soft, Non distended, Other (TTP epigrastic no peritoneal signs. )





- Back


Back: No CVA TTP, No spinal TTP, Other (No midline tenderness to palpation or 

percussion.  No step-off or deformity.  Paraspinal spasm right low lumbar.  

Reproduces her pain.)





- Derm


Derm: Warm and dry





- Extremities


Extremities: No edema, No calf tenderness / cord





- Neuro


Neuro: Alert and oriented X 3, CNs 2-12 intact, No motor deficit, No sensory 

deficit, Normal speech, Other (Normal bilateral lower extremity patellar and 

ankle jerk reflexes. Normal great toe extension bilaterally. no saddle 

anesthesia)





- Psych


Psych: Normal mood, Normal affect





Results





- Vitals


Vitals: 


                               Vital Signs - 24 hr











  10/05/23 10/05/23





  17:07 19:29


 


Temperature 36.2 C L 


 


Heart Rate 70 60


 


Respiratory 18 18





Rate  


 


Blood Pressure 98/59 L 121/84 H


 


O2 Saturation 99 100








                                     Oxygen











O2 Source                      Room air

















- Labs


Labs: 


                                Laboratory Tests











  10/05/23 10/05/23 10/05/23





  17:55 17:55 18:18


 


WBC  4.5 L  


 


RBC  4.01 L  


 


Hgb  12.0  


 


Hct  36.6 L  


 


MCV  91.3  


 


MCH  29.9  


 


MCHC  32.8  


 


RDW  13.0  


 


Plt Count  232  


 


MPV  9.1  


 


Neut # (Auto)  2.0  


 


Lymph # (Auto)  2.0  


 


Mono # (Auto)  0.4  


 


Eos # (Auto)  0.1  


 


Baso # (Auto)  0.0  


 


Absolute Nucleated RBC  0.00  


 


Nucleated RBC %  0.0  


 


Sodium   137 


 


Potassium   3.9 


 


Chloride   104 


 


Carbon Dioxide   28 


 


Anion Gap   5.0 L 


 


BUN   13 


 


Creatinine   0.7 


 


Estimated GFR (MDRD)   91 


 


Glucose   102 


 


Calcium   8.9 


 


Total Bilirubin   0.6 


 


AST   38 


 


ALT   74 H 


 


Alkaline Phosphatase   55 


 


Total Protein   6.8 


 


Albumin   4.2 


 


Globulin   2.6 


 


Albumin/Globulin Ratio   1.6 


 


Lipase   63 


 


Urine Color    YELLOW


 


Urine Clarity    CLEAR


 


Urine pH    6.0


 


Ur Specific Gravity    1.010


 


Urine Protein    NEGATIVE


 


Urine Glucose (UA)    NEGATIVE


 


Urine Ketones    NEGATIVE


 


Urine Occult Blood    NEGATIVE


 


Urine Nitrite    NEGATIVE


 


Urine Bilirubin    NEGATIVE


 


Urine Urobilinogen    0.2 (NORMAL)


 


Ur Leukocyte Esterase    NEGATIVE


 


Ur Microscopic Review    NOT INDICATED


 


Urine Culture Comments    NOT INDICATED


 


Urine HCG, Qual    NEGATIVE














PD Medical Decision Making





- ED course


Complexity details: reviewed results, re-evaluated patient, considered 

differential (No cauda equina, no spinal epidural abscess, no fracture, no 

aortic dissection or evidence of aneursym rupture), d/w patient


ED course: 





Patient is well-appearing, nontoxic.  Afebrile.  No acute laboratory 

abnormalities.  Feels much better after a GI cocktail and a small dose of 

morphine.  Will place on muscle relaxants and pain medication for home for her 

back.  We will start her on medication for her gastritis/reflux and possible 

ulcer.  No evidence of pancreatitis, no evidence of biliary colic.  Not tender 

in the right upper quadrant.  No indication for emergent imaging at this time.  

Patient is well-appearing, nontoxic.  Afebrile.  No UTI.  Patient counseled 

regarding signs and symptoms for which I believe and urgent re-evaluation would 

be necessary. Patient with good understanding of and agreement to plan and is 

comfortable going home at this time





This document was made in part using voice recognition software. While efforts 

are made to proofread this document, sound alike and grammatical errors may 

occur.





Departure





- Departure


Disposition: 01 Home, Self Care


Clinical Impression: 


Back strain


Qualifiers:


 Encounter type: initial encounter Qualified Code(s): S39.012A - Strain of 

muscle, fascia and tendon of lower back, initial encounter





Gastritis


Qualifiers:


 Gastritis type: unspecified gastritis Chronicity: chronic Gastritis bleeding: 

without bleeding Qualified Code(s): K29.50 - Unspecified chronic gastritis 

without bleeding





Condition: Good


Instructions:  ED Sprain Strain Lumbar, ED PUD Vs Gastritis


Follow-Up: 


Yovanny Rodríguez ARNP [Primary Care Provider] - Within 1 week


Prescriptions: 


Sucralfate [Carafate] 1 gm PO ACHS #60 tablet


Esomeprazole Magnesium [Nexium] 40 mg PO DAILY #30 cap


Famotidine [Pepcid] 20 mg PO BID #60 tablet


methocarbamoL [Robaxin] 500 mg PO Q6H PRN #20 tablet


 PRN Reason: muscle spasm


oxyCODONE [Roxicodone] 5 - 10 mg PO Q6H PRN #20 tablet MDD 6


 PRN Reason: pain


Comments: 


Your prescriptions were sent to [facility] in [place].  Please follow-up with 

your doctor for further care.  It is recommended that you have an EGD/endoscopy 

scheduled to evaluate your stomach.  Your doctor may want to refer you to 

physical therapy to help with your ongoing back pain.  If your pain fails to 

improve with medication, they may want to perform an MRI as well.





I am prescribing a short course of narcotic pain medication for you. These are 

potentially dangerous and addictive medications that should be used carefully. 


These medications may constipate you. Take an over-the-counter stool softener 

(docusate) twice daily with plenty of water while taking these medications. If 

you go 24 hours without a bowel movement, take over-the-counter miralax, per 

package instructions.


Do not drink or drive while taking these medications. 


If you received narcotic or sedating medications while in the emergency 

department, do not drive for 24 hours.


Store this medication in a safe, secure place and out of reach of children. 


It is a violation of federal law to give or sell this medication to another 

person or to use in a manner other than prescribed.


The ED will not refill narcotic prescriptions, including prescriptions lost or 

stolen.


To dispose of unwanted medications:


1. [facility] at [address] in 

[place] has a medication drop box. They accept prescription medications (in 

pill form) Monday through Friday 9:00 a.m. to 5:00 p.m. 


2. [facility] [employer] accepts prescription medications (in

 pill form only) for disposal year round. Call [phone] for more 

information. 


3. Contact the [facility] for the next [facility] sponsored prescription 

drug collection event. [phone], [phone], or [phone] 

[phone];


Discharge Date/Time: 10/05/23 19:34

## 2023-10-11 ENCOUNTER — HOSPITAL ENCOUNTER (OUTPATIENT)
Age: 43
End: 2023-10-11
Payer: COMMERCIAL

## 2023-10-11 DIAGNOSIS — Z80.3: ICD-10-CM

## 2023-10-11 DIAGNOSIS — N13.30: ICD-10-CM

## 2023-10-11 DIAGNOSIS — Z12.31: Primary | ICD-10-CM

## 2023-10-11 PROCEDURE — 77067 SCR MAMMO BI INCL CAD: CPT

## 2023-10-11 PROCEDURE — 76770 US EXAM ABDO BACK WALL COMP: CPT

## 2023-10-11 PROCEDURE — 77063 BREAST TOMOSYNTHESIS BI: CPT

## 2023-11-02 ENCOUNTER — HOSPITAL ENCOUNTER (OUTPATIENT)
Age: 43
End: 2023-11-02
Payer: COMMERCIAL

## 2023-11-02 DIAGNOSIS — M77.10: Primary | ICD-10-CM

## 2023-11-02 DIAGNOSIS — M54.40: ICD-10-CM

## 2023-11-02 DIAGNOSIS — M50.30: ICD-10-CM

## 2023-11-02 DIAGNOSIS — F17.201: ICD-10-CM

## 2023-11-02 DIAGNOSIS — Z80.52: ICD-10-CM

## 2023-11-02 DIAGNOSIS — N28.89: ICD-10-CM

## 2023-11-02 DIAGNOSIS — R39.9: ICD-10-CM

## 2023-11-02 DIAGNOSIS — N13.30: ICD-10-CM

## 2023-11-02 DIAGNOSIS — G89.29: ICD-10-CM

## 2023-11-02 LAB
BUN SERPL-MCNC: 11 MG/DL (ref 7–17)
CALCIUM SERPL-MCNC: 9.3 MG/DL (ref 8.4–10.2)
CHLORIDE SERPL-SCNC: 102 MMOL/L (ref 98–107)
CO2 SERPL-SCNC: 28 MMOL/L (ref 22–32)
ESTIMATED GLOMERULAR FILT RATE: > 60 ML/MIN (ref 60–?)
GLUCOSE SERPL-MCNC: 91 MG/DL (ref 70–100)
HEMOLYSIS: < 15 (ref 0–50)
POTASSIUM SERPL-SCNC: 4.3 MMOL/L (ref 3.4–5.1)
SODIUM SERPL-SCNC: 136 MMOL/L (ref 137–145)

## 2023-11-02 PROCEDURE — 81002 URINALYSIS NONAUTO W/O SCOPE: CPT

## 2023-11-02 PROCEDURE — 36415 COLL VENOUS BLD VENIPUNCTURE: CPT

## 2023-11-02 PROCEDURE — 80048 BASIC METABOLIC PNL TOTAL CA: CPT

## 2023-11-02 PROCEDURE — 73080 X-RAY EXAM OF ELBOW: CPT

## 2023-11-02 PROCEDURE — 72110 X-RAY EXAM L-2 SPINE 4/>VWS: CPT

## 2023-11-02 PROCEDURE — 99213 OFFICE O/P EST LOW 20 MIN: CPT

## 2023-11-02 PROCEDURE — 72040 X-RAY EXAM NECK SPINE 2-3 VW: CPT

## 2024-08-05 ENCOUNTER — HOSPITAL ENCOUNTER (OUTPATIENT)
Dept: HOSPITAL 73 - US | Age: 44
End: 2024-08-05
Payer: COMMERCIAL

## 2024-08-05 DIAGNOSIS — N63.20: Primary | ICD-10-CM

## 2024-08-05 DIAGNOSIS — N64.4: ICD-10-CM

## 2024-08-05 PROCEDURE — 76642 ULTRASOUND BREAST LIMITED: CPT

## 2024-11-22 ENCOUNTER — HOSPITAL ENCOUNTER (OUTPATIENT)
Dept: HOSPITAL 73 - MAMMO | Age: 44
End: 2024-11-22
Payer: COMMERCIAL

## 2024-11-22 DIAGNOSIS — Z80.3: ICD-10-CM

## 2024-11-22 DIAGNOSIS — Z12.31: Primary | ICD-10-CM

## 2024-11-22 DIAGNOSIS — R92.333: ICD-10-CM

## 2024-11-22 PROCEDURE — 77063 BREAST TOMOSYNTHESIS BI: CPT

## 2024-11-22 PROCEDURE — 77067 SCR MAMMO BI INCL CAD: CPT
